# Patient Record
Sex: MALE | Race: WHITE | Employment: FULL TIME | URBAN - METROPOLITAN AREA
[De-identification: names, ages, dates, MRNs, and addresses within clinical notes are randomized per-mention and may not be internally consistent; named-entity substitution may affect disease eponyms.]

---

## 2018-01-17 PROBLEM — C43.72 MALIGNANT MELANOMA OF LEFT LOWER EXTREMITY INCLUDING HIP (HCC): Status: ACTIVE | Noted: 2018-01-17

## 2018-01-23 RX ORDER — CEFAZOLIN SODIUM IN 0.9 % NACL 2 G/50 ML
2 INTRAVENOUS SOLUTION, PIGGYBACK (ML) INTRAVENOUS ONCE
Status: CANCELLED | OUTPATIENT
Start: 2018-02-22 | End: 2018-02-22

## 2018-02-21 ENCOUNTER — HOSPITAL ENCOUNTER (OUTPATIENT)
Dept: GENERAL RADIOLOGY | Age: 53
Discharge: HOME | End: 2018-02-21
Attending: SURGERY
Payer: COMMERCIAL

## 2018-02-21 ENCOUNTER — HOSPITAL ENCOUNTER (OUTPATIENT)
Dept: LAB | Age: 53
Discharge: HOME | End: 2018-02-21
Payer: COMMERCIAL

## 2018-02-21 PROCEDURE — 83615 LACTATE (LD) (LDH) ENZYME: CPT | Performed by: SURGERY

## 2018-02-21 PROCEDURE — 80053 COMPREHEN METABOLIC PANEL: CPT | Performed by: SURGERY

## 2018-02-21 PROCEDURE — 85610 PROTHROMBIN TIME: CPT | Performed by: SURGERY

## 2018-02-21 PROCEDURE — 71046 X-RAY EXAM CHEST 2 VIEWS: CPT

## 2018-02-21 PROCEDURE — 85730 THROMBOPLASTIN TIME PARTIAL: CPT | Performed by: SURGERY

## 2018-02-21 PROCEDURE — 85027 COMPLETE CBC AUTOMATED: CPT | Performed by: SURGERY

## 2018-02-22 ENCOUNTER — APPOINTMENT (OUTPATIENT)
Dept: NUCLEAR MEDICINE | Age: 53
End: 2018-02-22
Attending: SURGERY
Payer: COMMERCIAL

## 2018-02-22 ENCOUNTER — ANESTHESIA EVENT (OUTPATIENT)
Dept: SURGERY | Age: 53
End: 2018-02-22
Payer: COMMERCIAL

## 2018-02-22 ENCOUNTER — ANESTHESIA (OUTPATIENT)
Dept: SURGERY | Age: 53
End: 2018-02-22
Payer: COMMERCIAL

## 2018-02-22 ENCOUNTER — HOSPITAL ENCOUNTER (OUTPATIENT)
Age: 53
Setting detail: OUTPATIENT SURGERY
Discharge: HOME | End: 2018-02-22
Attending: SURGERY | Admitting: SURGERY
Payer: COMMERCIAL

## 2018-02-22 VITALS
TEMPERATURE: 97.4 F | DIASTOLIC BLOOD PRESSURE: 57 MMHG | HEART RATE: 63 BPM | WEIGHT: 248 LBS | SYSTOLIC BLOOD PRESSURE: 101 MMHG | RESPIRATION RATE: 19 BRPM | OXYGEN SATURATION: 96 % | BODY MASS INDEX: 33.59 KG/M2 | HEIGHT: 72 IN

## 2018-02-22 DIAGNOSIS — C43.72 MALIGNANT MELANOMA OF LEFT LOWER EXTREMITY (HCC): ICD-10-CM

## 2018-02-22 DIAGNOSIS — C43.72 MALIGNANT MELANOMA OF LEFT LOWER EXTREMITY INCLUDING HIP (HCC): Primary | ICD-10-CM

## 2018-02-22 PROCEDURE — 76010000162 HC OR TIME 1.5 TO 2 HR INTENSV-TIER 1: Performed by: SURGERY

## 2018-02-22 PROCEDURE — 74011250636 HC RX REV CODE- 250/636

## 2018-02-22 PROCEDURE — 74011250636 HC RX REV CODE- 250/636: Performed by: ANESTHESIOLOGY

## 2018-02-22 PROCEDURE — 88341 IMHCHEM/IMCYTCHM EA ADD ANTB: CPT | Performed by: SURGERY

## 2018-02-22 PROCEDURE — A9541 TC99M SULFUR COLLOID: HCPCS

## 2018-02-22 PROCEDURE — 74011000250 HC RX REV CODE- 250

## 2018-02-22 PROCEDURE — 88305 TISSUE EXAM BY PATHOLOGIST: CPT | Performed by: SURGERY

## 2018-02-22 PROCEDURE — 76060000034 HC ANESTHESIA 1.5 TO 2 HR: Performed by: SURGERY

## 2018-02-22 PROCEDURE — 77030002996 HC SUT SLK J&J -A: Performed by: SURGERY

## 2018-02-22 PROCEDURE — 77030002916 HC SUT ETHLN J&J -A: Performed by: SURGERY

## 2018-02-22 PROCEDURE — 88342 IMHCHEM/IMCYTCHM 1ST ANTB: CPT | Performed by: SURGERY

## 2018-02-22 PROCEDURE — 76210000006 HC OR PH I REC 0.5 TO 1 HR: Performed by: SURGERY

## 2018-02-22 PROCEDURE — 74011000250 HC RX REV CODE- 250: Performed by: SURGERY

## 2018-02-22 PROCEDURE — 77030020143 HC AIRWY LARYN INTUB CGAS -A: Performed by: ANESTHESIOLOGY

## 2018-02-22 PROCEDURE — 77030020782 HC GWN BAIR PAWS FLX 3M -B: Performed by: ANESTHESIOLOGY

## 2018-02-22 PROCEDURE — 88307 TISSUE EXAM BY PATHOLOGIST: CPT | Performed by: SURGERY

## 2018-02-22 PROCEDURE — 77030011640 HC PAD GRND REM COVD -A: Performed by: SURGERY

## 2018-02-22 PROCEDURE — 74011250636 HC RX REV CODE- 250/636: Performed by: SURGERY

## 2018-02-22 PROCEDURE — 74011250637 HC RX REV CODE- 250/637: Performed by: ANESTHESIOLOGY

## 2018-02-22 PROCEDURE — 76210000020 HC REC RM PH II FIRST 0.5 HR: Performed by: SURGERY

## 2018-02-22 PROCEDURE — 77030003028 HC SUT VCRL J&J -A: Performed by: SURGERY

## 2018-02-22 PROCEDURE — 77030031139 HC SUT VCRL2 J&J -A: Performed by: SURGERY

## 2018-02-22 RX ORDER — DEXAMETHASONE SODIUM PHOSPHATE 4 MG/ML
INJECTION, SOLUTION INTRA-ARTICULAR; INTRALESIONAL; INTRAMUSCULAR; INTRAVENOUS; SOFT TISSUE AS NEEDED
Status: DISCONTINUED | OUTPATIENT
Start: 2018-02-22 | End: 2018-02-22 | Stop reason: HOSPADM

## 2018-02-22 RX ORDER — CEFAZOLIN SODIUM/WATER 2 G/20 ML
2 SYRINGE (ML) INTRAVENOUS
Status: COMPLETED | OUTPATIENT
Start: 2018-02-22 | End: 2018-02-22

## 2018-02-22 RX ORDER — SODIUM CHLORIDE 0.9 % (FLUSH) 0.9 %
5-10 SYRINGE (ML) INJECTION EVERY 8 HOURS
Status: DISCONTINUED | OUTPATIENT
Start: 2018-02-22 | End: 2018-02-22 | Stop reason: HOSPADM

## 2018-02-22 RX ORDER — KETOROLAC TROMETHAMINE 30 MG/ML
30 INJECTION, SOLUTION INTRAMUSCULAR; INTRAVENOUS AS NEEDED
Status: DISCONTINUED | OUTPATIENT
Start: 2018-02-22 | End: 2018-02-22 | Stop reason: HOSPADM

## 2018-02-22 RX ORDER — OXYCODONE AND ACETAMINOPHEN 5; 325 MG/1; MG/1
TABLET ORAL
Qty: 40 TAB | Refills: 0 | Status: SHIPPED | OUTPATIENT
Start: 2018-02-22

## 2018-02-22 RX ORDER — DOCUSATE SODIUM 100 MG/1
100 CAPSULE, LIQUID FILLED ORAL 2 TIMES DAILY
Qty: 20 CAP | Refills: 0 | Status: SHIPPED | OUTPATIENT
Start: 2018-02-22 | End: 2018-03-24

## 2018-02-22 RX ORDER — BUPIVACAINE HYDROCHLORIDE 5 MG/ML
INJECTION, SOLUTION EPIDURAL; INTRACAUDAL AS NEEDED
Status: DISCONTINUED | OUTPATIENT
Start: 2018-02-22 | End: 2018-02-22 | Stop reason: HOSPADM

## 2018-02-22 RX ORDER — MIDAZOLAM HYDROCHLORIDE 1 MG/ML
2 INJECTION, SOLUTION INTRAMUSCULAR; INTRAVENOUS
Status: DISCONTINUED | OUTPATIENT
Start: 2018-02-22 | End: 2018-02-22 | Stop reason: HOSPADM

## 2018-02-22 RX ORDER — ACETAMINOPHEN 500 MG
1000 TABLET ORAL ONCE
Status: COMPLETED | OUTPATIENT
Start: 2018-02-22 | End: 2018-02-22

## 2018-02-22 RX ORDER — FENTANYL CITRATE 50 UG/ML
INJECTION, SOLUTION INTRAMUSCULAR; INTRAVENOUS AS NEEDED
Status: DISCONTINUED | OUTPATIENT
Start: 2018-02-22 | End: 2018-02-22 | Stop reason: HOSPADM

## 2018-02-22 RX ORDER — SODIUM CHLORIDE, SODIUM LACTATE, POTASSIUM CHLORIDE, CALCIUM CHLORIDE 600; 310; 30; 20 MG/100ML; MG/100ML; MG/100ML; MG/100ML
125 INJECTION, SOLUTION INTRAVENOUS CONTINUOUS
Status: DISCONTINUED | OUTPATIENT
Start: 2018-02-22 | End: 2018-02-22 | Stop reason: HOSPADM

## 2018-02-22 RX ORDER — SODIUM CHLORIDE 0.9 % (FLUSH) 0.9 %
5-10 SYRINGE (ML) INJECTION AS NEEDED
Status: DISCONTINUED | OUTPATIENT
Start: 2018-02-22 | End: 2018-02-22 | Stop reason: HOSPADM

## 2018-02-22 RX ORDER — LIDOCAINE HYDROCHLORIDE 10 MG/ML
0.1 INJECTION INFILTRATION; PERINEURAL AS NEEDED
Status: DISCONTINUED | OUTPATIENT
Start: 2018-02-22 | End: 2018-02-22 | Stop reason: HOSPADM

## 2018-02-22 RX ORDER — LIDOCAINE HYDROCHLORIDE 20 MG/ML
INJECTION, SOLUTION EPIDURAL; INFILTRATION; INTRACAUDAL; PERINEURAL AS NEEDED
Status: DISCONTINUED | OUTPATIENT
Start: 2018-02-22 | End: 2018-02-22 | Stop reason: HOSPADM

## 2018-02-22 RX ORDER — EPHEDRINE SULFATE 50 MG/ML
INJECTION, SOLUTION INTRAVENOUS AS NEEDED
Status: DISCONTINUED | OUTPATIENT
Start: 2018-02-22 | End: 2018-02-22 | Stop reason: HOSPADM

## 2018-02-22 RX ORDER — ONDANSETRON 2 MG/ML
INJECTION INTRAMUSCULAR; INTRAVENOUS AS NEEDED
Status: DISCONTINUED | OUTPATIENT
Start: 2018-02-22 | End: 2018-02-22 | Stop reason: HOSPADM

## 2018-02-22 RX ORDER — HYDROMORPHONE HYDROCHLORIDE 2 MG/ML
0.5 INJECTION, SOLUTION INTRAMUSCULAR; INTRAVENOUS; SUBCUTANEOUS
Status: DISCONTINUED | OUTPATIENT
Start: 2018-02-22 | End: 2018-02-22 | Stop reason: HOSPADM

## 2018-02-22 RX ORDER — OXYCODONE HYDROCHLORIDE 5 MG/1
10 TABLET ORAL
Status: DISCONTINUED | OUTPATIENT
Start: 2018-02-22 | End: 2018-02-22 | Stop reason: HOSPADM

## 2018-02-22 RX ORDER — PROPOFOL 10 MG/ML
INJECTION, EMULSION INTRAVENOUS AS NEEDED
Status: DISCONTINUED | OUTPATIENT
Start: 2018-02-22 | End: 2018-02-22 | Stop reason: HOSPADM

## 2018-02-22 RX ORDER — NALOXONE HYDROCHLORIDE 0.4 MG/ML
0.1 INJECTION, SOLUTION INTRAMUSCULAR; INTRAVENOUS; SUBCUTANEOUS AS NEEDED
Status: DISCONTINUED | OUTPATIENT
Start: 2018-02-22 | End: 2018-02-22 | Stop reason: HOSPADM

## 2018-02-22 RX ADMIN — PROPOFOL 200 MG: 10 INJECTION, EMULSION INTRAVENOUS at 11:23

## 2018-02-22 RX ADMIN — FENTANYL CITRATE 50 MCG: 50 INJECTION, SOLUTION INTRAMUSCULAR; INTRAVENOUS at 12:09

## 2018-02-22 RX ADMIN — FENTANYL CITRATE 25 MCG: 50 INJECTION, SOLUTION INTRAMUSCULAR; INTRAVENOUS at 11:39

## 2018-02-22 RX ADMIN — ONDANSETRON 4 MG: 2 INJECTION INTRAMUSCULAR; INTRAVENOUS at 11:24

## 2018-02-22 RX ADMIN — EPHEDRINE SULFATE 5 MG: 50 INJECTION, SOLUTION INTRAVENOUS at 11:46

## 2018-02-22 RX ADMIN — DEXAMETHASONE SODIUM PHOSPHATE 4 MG: 4 INJECTION, SOLUTION INTRA-ARTICULAR; INTRALESIONAL; INTRAMUSCULAR; INTRAVENOUS; SOFT TISSUE at 11:24

## 2018-02-22 RX ADMIN — MIDAZOLAM HYDROCHLORIDE 2 MG: 1 INJECTION, SOLUTION INTRAMUSCULAR; INTRAVENOUS at 10:44

## 2018-02-22 RX ADMIN — OXYCODONE HYDROCHLORIDE 10 MG: 5 TABLET ORAL at 13:25

## 2018-02-22 RX ADMIN — FENTANYL CITRATE 25 MCG: 50 INJECTION, SOLUTION INTRAMUSCULAR; INTRAVENOUS at 11:15

## 2018-02-22 RX ADMIN — FENTANYL CITRATE 25 MCG: 50 INJECTION, SOLUTION INTRAMUSCULAR; INTRAVENOUS at 11:30

## 2018-02-22 RX ADMIN — EPHEDRINE SULFATE 5 MG: 50 INJECTION, SOLUTION INTRAVENOUS at 12:27

## 2018-02-22 RX ADMIN — LIDOCAINE HYDROCHLORIDE 60 MG: 20 INJECTION, SOLUTION EPIDURAL; INFILTRATION; INTRACAUDAL; PERINEURAL at 11:23

## 2018-02-22 RX ADMIN — FENTANYL CITRATE 25 MCG: 50 INJECTION, SOLUTION INTRAMUSCULAR; INTRAVENOUS at 11:45

## 2018-02-22 RX ADMIN — EPHEDRINE SULFATE 10 MG: 50 INJECTION, SOLUTION INTRAVENOUS at 11:32

## 2018-02-22 RX ADMIN — FENTANYL CITRATE 25 MCG: 50 INJECTION, SOLUTION INTRAMUSCULAR; INTRAVENOUS at 12:06

## 2018-02-22 RX ADMIN — ACETAMINOPHEN 1000 MG: 500 TABLET, FILM COATED ORAL at 10:31

## 2018-02-22 RX ADMIN — SODIUM CHLORIDE, SODIUM LACTATE, POTASSIUM CHLORIDE, AND CALCIUM CHLORIDE: 600; 310; 30; 20 INJECTION, SOLUTION INTRAVENOUS at 10:57

## 2018-02-22 RX ADMIN — Medication 2 G: at 11:14

## 2018-02-22 RX ADMIN — FENTANYL CITRATE 25 MCG: 50 INJECTION, SOLUTION INTRAMUSCULAR; INTRAVENOUS at 11:11

## 2018-02-22 RX ADMIN — SODIUM CHLORIDE, SODIUM LACTATE, POTASSIUM CHLORIDE, AND CALCIUM CHLORIDE 125 ML/HR: 600; 310; 30; 20 INJECTION, SOLUTION INTRAVENOUS at 10:32

## 2018-02-22 NOTE — BRIEF OP NOTE
BRIEF OPERATIVE NOTE    Date of Procedure: 2/22/2018   Preoperative Diagnosis: Malignant melanoma of left lower extremity (HCC) [C43.72]  Postoperative Diagnosis: Malignant melanoma of left lower extremity (HCC) [C43.72]    Procedure(s):  MELANOMA WIDE EXCISION LEFT CALF 4 cm   Complex repair 11 cm  SENTINAL NODE BIOPSY LEFT GROIN  SENTINEL NODE MAPPING    Surgeon(s) and Role:     * Jyoti Cha MD - Primary         Assistant Staff: Nurse Practitioner: Hayden Benson NP      Surgical Staff:  Circ-1: Sola Vincent RN  Circ-2: Adrian Harris RN  Circ-Relief: Manuel Aranda RN  Scrub Tech-1: [place] [employer]  Scrub Tech-Relief: Catracho Lexa  Nurse Practitioner: Hayden Benson NP  Event Time In   Incision Start 11:38 AM   Incision Close 12:44 PM     Anesthesia: General   Estimated Blood Loss: minimal  Specimens:   ID Type Source Tests Collected by Time Destination   1 : SENTINEL LYMPH NODE #1  Preservative Groin  Jyoti Cha MD 2/22/2018 12:00 PM Pathology   2 : SENTINEL LYMPH NODE #2 Preservative Groin  Jyoti Cha MD 2/22/2018 12:05 PM Pathology   3 : left lateral calf melanoma Preservative Lesion  Jyoti Cha MD 2/22/2018 12:16 PM Pathology      Findings: successful SLN biopsy   Complications: none  Implants: * No implants in log *

## 2018-02-22 NOTE — OP NOTES
[address]    Mikaela Cole  MR#: [id number]  : 1965  ACCOUNT #: [de-identified]   DATE OF SERVICE: 2018    PREOPERATIVE DIAGNOSIS:  Left calf melanoma. POSTOPERATIVE DIAGNOSIS:  Left calf melanoma. PROCEDURES PERFORMED:   1. Left inguinal sentinel lymph node biopsy with lymphatic mapping. 2.  Wide local excision of left calf melanoma 4 cm, with complex repair 11 cm. SURGEON: Latrice Khan MD    ASSISTANT: Justo Tamayo NP    ANESTHESIA: NP    ESTIMATED BLOOD LOSS: minimal    SPECIMENS REMOVED: as above    COMPLICATIONS: none      INDICATION:  This is a 64-year-old male who presented with a lesion in his left calf. This was biopsied and turned out to be 1.1 mm melanoma and he was offered surgery. He understood the risks and benefits and wished to proceed. FINDINGS:  Successful identification of sentinel lymph nodes, both are hot and inflamed. PROCEDURE:  After informed consent obtained, the patient brought into the operating room, left in supine position. General anesthesia was administered. The patient's left lower extremity and the left groin area were prepped and draped in usual routine fashion. As noted, he did have radioactive isotope injected. There were 3 lymph nodes identified, 2 close together. The third one is more distal into inside the iliac area so I decided to proceed with the 2 lymph nodes identified in the upper thigh and the inguinal area. This was confirmed by a Neoprobe. The incision was made in the left upper thigh and dissection carried through the superficial fascia and then the signal was identified and confirmed. Further dissection revealed active lymph nodes, right around the cutaneous nerve and saphenous vein. This was carefully dissected and removed and then further exploration identified another lymph node slightly higher location, it is also looking inflamed lymph node. This was removed.   The lymphatic vessels were tied with Vicryl sutures then the surgical field inspected and hemostasis obtained. The incision closed in layers and the deep layer was closed with a 3-0 Vicryl stitch and the skin closed with 4-0 Vicryl stitch in subcuticular running fashion. I then turned attention to the calf. The previous biopsy site measured about 1.5 cm. I marked 1 cm around each side and then an ellipse of skin incision was made. The incision was deepened into the subcutaneous and then carried down all the way through the fascia, then the specimen released from the fascia. This was labeled with a short superior stitch and a long posterior stitch, then the wound was undermined on both sides generously for a tension-free closure. Then, the deep layer was closed with interrupted 2-0 Vicryl stitch and the skin was closed with a running 2-0 nylon. The patient tolerated the procedure well and was transferred to recovery room in stable condition. All instrument count and lap count correct. Estimated blood loss was minimal.      Gertrude Elizabeth was the first assistant on this case. She assisted during the entire case.       Dimitri Ott MD       BY / EV  D: 02/22/2018 13:03     T: 02/22/2018 14:01  JOB #: [id number]

## 2018-02-22 NOTE — H&P
[facility]  [address] [place] [address], [address] [place] [address]  [phone]      Patient:  Namita Bermudez  : 1965     [facility]  Namita Bermudez is a 45 y.o. male who presents to the office with a melanoma of the left lower leg. He states he had a mole there and over the past year it started changing. Dermatologist (Dr. Archie Friend) biopsied the mole and pathology revealed melanoma with breslow thickness 1.10mm and brandy level IV with no ulceration.       Grandfather melanoma in 80's  Uncle melanoma in 70's             Past Medical History:   Diagnosis Date    Allergic rhinitis               Current Outpatient Prescriptions   Medication Sig Dispense Refill    PSEUDOEPHEDRINE-guaiFENesin (MUCINEX D)  mg per tablet Take 1 Tab by mouth every twelve (12) hours.        NAPROXEN NA-DIPHENHYDRAMIN HCL (ALEVE PM PO) Take  by mouth.        triamcinolone (NASACORT AQ) 55 mcg nasal inhaler 2 Sprays daily.               Allergies   Allergen Reactions    Codeine Nausea Only    Penicillin G Rash      No past surgical history on file. Family History   Problem Relation Age of Onset    Heart Disease Brother         hx bypass surgery    Diabetes Brother      Sleep Apnea Brother         non-compliant with CPAP           Social History   Substance Use Topics    Smoking status: Never Smoker    Smokeless tobacco: Never Used    Alcohol use Not on file         Review of Systems   A comprehensive review of systems was negative except for that written in the HPI.       Physical Exam       Visit Vitals    /88    Pulse 66    Ht 6' 1\" (1.854 m)    Wt 256 lb (116.1 kg)    SpO2 97%    BMI 33.78 kg/m2         General:                    Alert, oriented, cooperative, awake patient in no acute distress   Skin:                                    Warm, moist with good texture   Eyes:                                   Sclera are clear, extraocular muscles intact  HENT: Normocephalic; oral mucosa moist, nares patent; neck is supple; trachea midline  Respiratory:               Lungs clear to auscultation bilaterally, breathing is non-labored   Chest:                                  Symmetric throughout one respiratory excursion; no supraclavicular lymphadenopathy  CV:                                      Regular rate and rhythm, no appreciable murmurs, rubs, gallops  Abdomen:                  Soft, protuberant but non-distended; bowel sounds are normoactive   Extremities:               No cyanosis, clubbing or edema, scabbed area noted to left lateral calf. No groin lymphadenopathy appreciated  Neurological:             No focal signs        Labs: No results found for: CMP, APTT, PTP, INR      Assessment/Plan:   Namita Bermudez is a 45 y.o. male who has signs and symptoms consistent with T2 Melanoma to left lower leg. Discussed the need for clear margins and this would make his incision quite long. He voiced understanding.     1. Wide local excision with SLND     Praveena Crowley NP     I have seen and examined the patient with the Nurse Practitioner and agree with the above assessment and plan.      No new c/o, ready to proceed.   Juanito Domingo MD

## 2018-02-22 NOTE — IP AVS SNAPSHOT
Marvel Frederick 
 
 
 [address] [facility] [address] 
[phone] Patient: Abhi Alcantar MRN: [id number] FFW:[id number][birth year][id number] About your hospitalization You were admitted on:  February 22, 2018 You last received care in the:  [facility] PACU You were discharged on:  February 22, 2018 Why you were hospitalized Your primary diagnosis was:  Not on File Follow-up Information Follow up With Details Comments Contact Info Damaris Greene MD Go on 3/6/2018 follow up appointment at 1:30pm [address] 
[address] [address] 
[phone] Your Scheduled Appointments Tuesday March 06, 2018  1:30 PM EST Global Post Op with Damaris Greene MD  
[facility] ([facility]) [address] [place] [address]  
[phone] Discharge Orders None A check imelda indicates which time of day the medication should be taken. My Medications START taking these medications Instructions Each Dose to Equal  
 Morning Noon Evening Bedtime  
 docusate sodium 100 mg capsule Commonly known as:  Carletha Benne Your last dose was: Your next dose is: Take 1 Cap by mouth two (2) times a day for 30 days. 100 mg  
    
   
   
   
  
 oxyCODONE-acetaminophen 5-325 mg per tablet Commonly known as:  PERCOCET Your last dose was: Your next dose is:    
   
   
 1-2 tabs by mouth every four hours prn pain CONTINUE taking these medications Instructions Each Dose to Equal  
 Morning Noon Evening Bedtime ALEVE PM PO Your last dose was: Your next dose is: Take 1 Tab by mouth as needed. 1 Tab Where to Get Your Medications These medications [id number]   [address] [facility], [address] [place] [address] Phone:  [phone] docusate sodium 100 mg capsule Information on where to get these meds will be given to you by the nurse or doctor. ! Ask your nurse or doctor about these medications  
  oxyCODONE-acetaminophen 5-325 mg per tablet Discharge Instructions Discharge Instructions/Follow-up Plans: MD Instructions: 
  
Follow-up with Dr. Charito Hurley. In 2 weeks, will call if we get pathology results sooner Remove plastic dressing and gauze after 48 hours, leave sterile strips on for 7-10 days Keep incisions clean and dry, may remain uncovered after 48 hours ok to use dry dressing if draining Do not apply lotions, creams or ointments to incisions. 
  
Diet - as tolerated Activity - ambulate - as tolerated May shower - no tub baths or soaking/submerging. 
  
No driving while taking narcotics. Do not drink alcohol while taking narcotics. Resume other home medications. Take Rx as prescribed Take colace stool softener as needed to avoid constipation will on narcotics 
  
If problems or questions arise, please call our office at [phone]. 
  
Greater than 30 minutes were spent discharging the patientAfter general anesthesia or intravenous sedation, for 24 hours or while taking prescription Narcotics: · Limit your activities · Do not drive and operate hazardous machinery · Do not make important personal or business decisions · Do  not drink alcoholic beverages · If you have not urinated within 8 hours after discharge, please contact your surgeon on call. *  Please give a list of your current medications to your Primary Care Provider. *  Please update this list whenever your medications are discontinued, doses are 
    changed, or new medications (including over-the-counter products) are added. *  Please carry medication information at all times in case of emergency situations. These are general instructions for a healthy lifestyle: No smoking/ No tobacco products/ Avoid exposure to second hand smoke Surgeon General's Warning:  Quitting smoking now greatly reduces serious risk to your health. Obesity, smoking, and sedentary lifestyle greatly increases your risk for illness A healthy diet, regular physical exercise & weight monitoring are important for maintaining a healthy lifestyle You may be retaining fluid if you have a history of heart failure or if you experience any of the following symptoms:  Weight gain of 3 pounds or more overnight or 5 pounds in a week, increased swelling in our hands or feet or shortness of breath while lying flat in bed. Please call your doctor as soon as you notice any of these symptoms; do not wait until your next office visit. Recognize signs and symptoms of STROKE: 
F-face looks uneven A-arms unable to move or move unevenly S-speech slurred or non-existent T-time-call 911 as soon as signs and symptoms begin-DO NOT go Back to bed or wait to see if you get better-TIME IS BRAIN Introducing [facility]! [facility] introduces Joome patient portal. Now you can access parts of your medical record, email your doctor's office, and request medication refills online. 1. In your internet browser, go to [email or web address] 2. Click on the First Time User? Click Here link in the Sign In box. You will see the New Member Sign Up page. 3. Enter your Joome Access Code exactly as it appears below. You will not need to use this code after youve completed the sign-up process. If you do not sign up before the expiration date, you must request a new code. · Joome Access Code: [email or web address] Expires: 4/16/2018  3:43 PM 
 
4. Enter the last four digits of your Social Security Number (xxxx) and Date of Birth (mm/dd/yyyy) as indicated and click Submit. You will be taken to the next sign-up page. 5. Create a Gatekeeper System ID. This will be your Gatekeeper System login ID and cannot be changed, so think of one that is secure and easy to remember. 6. Create a Gatekeeper System password. You can change your password at any time. 7. Enter your Password Reset Question and Answer. This can be used at a later time if you forget your password. 8. Enter your e-mail address. You will receive e-mail notification when new information is available in [address] 9. Click Sign Up. You can now view and download portions of your medical record. 10. Click the Download Summary menu link to download a portable copy of your medical information. If you have questions, please visit the Frequently Asked Questions section of the Gatekeeper System website. Remember, Gatekeeper System is NOT to be used for urgent needs. For medical emergencies, dial 911. Now available from your iPhone and Android! Providers Seen During Your Hospitalization Provider Specialty Primary office phone Jean-Claude Sheets MD General Surgery [phone] Your Primary Care Physician (PCP) Primary Care Physician Office Phone Office Fax Roberto Lugo [phone] You are allergic to the following Allergen Reactions Codeine Nausea Only Penicillin G Rash Patient states he has taken other \"cillins\" and has been fine. Recent Documentation Height Weight BMI Smoking Status 1.829 m 112.5 kg 33.63 kg/m2 Never Smoker Emergency Contacts Name Discharge Info Relation Home Work Mobile Yue Moe DISCHARGE CAREGIVER [3] Spouse [3] [phone] Patient Belongings The following personal items are in your possession at time of discharge: 
  Dental Appliances: None         Home Medications: None   Jewelry: None  Clothing: Shirt, Pants, Undergarments, Footwear, Socks    Other Valuables: Cell Phone, Selina Puja Please provide this summary of care documentation to your next provider. Signatures-by signing, you are acknowledging that this After Visit Summary has been reviewed with you and you have received a copy. Patient Signature:  ____________________________________________________________ Date:  ____________________________________________________________  
  
Curlie Ruths Provider Signature:  ____________________________________________________________ Date:  ____________________________________________________________

## 2018-02-22 NOTE — ANESTHESIA POSTPROCEDURE EVALUATION
Post-Anesthesia Evaluation and Assessment    Patient: Reji Da Silva MRN: [id number]  SSN: [id number]    YOB: 1965  Age: 52 y.o. Sex: male       Cardiovascular Function/Vital Signs  Visit Vitals    /57    Pulse 63    Temp 36.3 °C (97.4 °F)    Resp 19    Ht 6' (1.829 m)    Wt 112.5 kg (248 lb)    SpO2 96%    BMI 33.63 kg/m2       Patient is status post general anesthesia for Procedure(s):  MELANOMA WIDE EXCISION LEFT CALF AND SENTINAL NODE DISECTION LEFT GROIN  SENTINEL NODE MAPPING. Nausea/Vomiting: None    Postoperative hydration reviewed and adequate. Pain:  Pain Scale 1: Numeric (0 - 10) (02/22/18 1327)  Pain Intensity 1: 2 (02/22/18 1327)   Managed    Neurological Status:   Neuro (WDL): Within Defined Limits (02/22/18 1323)  Neuro  Neurologic State: Drowsy (02/22/18 1254)  LUE Motor Response: Purposeful (02/22/18 1254)  LLE Motor Response: Purposeful (02/22/18 1254)  RUE Motor Response: Purposeful (02/22/18 1254)  RLE Motor Response: Purposeful (02/22/18 1254)   At baseline    Mental Status and Level of Consciousness: Arousable    Pulmonary Status:   O2 Device: Room air (02/22/18 1323)   Adequate oxygenation and airway patent    Complications related to anesthesia: None    Post-anesthesia assessment completed.  No concerns    Signed By: Bryant Barajas MD     February 22, 2018

## 2018-02-22 NOTE — ANESTHESIA PREPROCEDURE EVALUATION
Anesthetic History               Review of Systems / Medical History  Patient summary reviewed, nursing notes reviewed and pertinent labs reviewed    Pulmonary        Sleep apnea           Neuro/Psych              Cardiovascular                  Exercise tolerance: >4 METS     GI/Hepatic/Renal                Endo/Other        Obesity     Other Findings            Physical Exam    Airway  Mallampati: I  TM Distance: > 6 cm  Neck ROM: normal range of motion   Mouth opening: Normal     Cardiovascular  Regular rate and rhythm,  S1 and S2 normal,  no murmur, click, rub, or gallop             Dental  No notable dental hx       Pulmonary  Breath sounds clear to auscultation               Abdominal  GI exam deferred       Other Findings            Anesthetic Plan    ASA: 1  Anesthesia type: general            Anesthetic plan and risks discussed with: Patient and Spouse

## 2018-02-22 NOTE — DISCHARGE INSTRUCTIONS
Discharge Instructions/Follow-up Plans:   MD Instructions:     Follow-up with Dr. Krystal Jacob. In 2 weeks, will call if we get pathology results sooner  Remove plastic dressing and gauze after 48 hours, leave sterile strips on for 7-10 days  Keep incisions clean and dry, may remain uncovered after 48 hours ok to use dry dressing if draining  Do not apply lotions, creams or ointments to incisions.     Diet - as tolerated  Activity - ambulate - as tolerated  May shower - no tub baths or soaking/submerging.     No driving while taking narcotics. Do not drink alcohol while taking narcotics. Resume other home medications. Take Rx as prescribed   Take colace stool softener as needed to avoid constipation will on narcotics     If problems or questions arise, please call our office at [phone].     Greater than 30 minutes were spent discharging the patientAfter general anesthesia or intravenous sedation, for 24 hours or while taking prescription Narcotics:  · Limit your activities  · Do not drive and operate hazardous machinery  · Do not make important personal or business decisions  · Do  not drink alcoholic beverages  · If you have not urinated within 8 hours after discharge, please contact your surgeon on call. *  Please give a list of your current medications to your Primary Care Provider. *  Please update this list whenever your medications are discontinued, doses are      changed, or new medications (including over-the-counter products) are added. *  Please carry medication information at all times in case of emergency situations. These are general instructions for a healthy lifestyle:  No smoking/ No tobacco products/ Avoid exposure to second hand smoke  Surgeon General's Warning:  Quitting smoking now greatly reduces serious risk to your health.   Obesity, smoking, and sedentary lifestyle greatly increases your risk for illness  A healthy diet, regular physical exercise & weight monitoring are important for maintaining a healthy lifestyle    You may be retaining fluid if you have a history of heart failure or if you experience any of the following symptoms:  Weight gain of 3 pounds or more overnight or 5 pounds in a week, increased swelling in our hands or feet or shortness of breath while lying flat in bed. Please call your doctor as soon as you notice any of these symptoms; do not wait until your next office visit.     Recognize signs and symptoms of STROKE:  F-face looks uneven  A-arms unable to move or move unevenly  S-speech slurred or non-existent  T-time-call 911 as soon as signs and symptoms begin-DO NOT go       Back to bed or wait to see if you get better-TIME IS BRAIN

## 2022-03-04 ENCOUNTER — HOSPITAL ENCOUNTER (OUTPATIENT)
Dept: LAB | Age: 57
Discharge: HOME | End: 2022-03-04

## 2022-03-04 PROCEDURE — 88305 TISSUE EXAM BY PATHOLOGIST: CPT

## (undated) DEVICE — COVER US PRB W12XL244CM FLD IORT STR TIP

## (undated) DEVICE — SUTURE PERMAHAND SZ 0 L30IN NONABSORBABLE BLK L30MM PSL 3/8 590H

## (undated) DEVICE — SUT ETHLN 3-0 18IN PS1 BLK --

## (undated) DEVICE — DRAPE TWL SURG 16X26IN BLU ORB04] ALLCARE INC]

## (undated) DEVICE — SUTURE VCRL SZ 2-0 L27IN ABSRB UD L26MM SH 1/2 CIR J417H

## (undated) DEVICE — SUTURE VCRL SZ 3-0 L27IN ABSRB UD L26MM SH 1/2 CIR J416H

## (undated) DEVICE — DRAPE,U/SHT,SPLIT,FILM,60X84,STERILE: Brand: MEDLINE

## (undated) DEVICE — INTENDED FOR TISSUE SEPARATION, AND OTHER PROCEDURES THAT REQUIRE A SHARP SURGICAL BLADE TO PUNCTURE OR CUT.: Brand: BARD-PARKER SAFETY BLADES SIZE 15, STERILE

## (undated) DEVICE — MASTISOL ADHESIVE LIQ 2/3ML

## (undated) DEVICE — 2000CC GUARDIAN II: Brand: GUARDIAN

## (undated) DEVICE — SURGICAL PROCEDURE PACK BASIC ST FRANCIS

## (undated) DEVICE — SHEET, DRAPE, SPLIT, STERILE: Brand: MEDLINE

## (undated) DEVICE — SUTURE VCRL SZ 4-0 L27IN ABSRB UD L19MM PS-2 3/8 CIR PRIM J426H

## (undated) DEVICE — SUTURE ETHLN SZ 2-0 L18IN NONABSORBABLE BLK L26MM PS 3/8 585H

## (undated) DEVICE — SUTURE VCRL SZ 3-0 L18IN ABSRB UD W/O NDL POLYGLACTIN 910 J110T

## (undated) DEVICE — (D)STRIP SKN CLSR 0.5X4IN WHT --

## (undated) DEVICE — 3M™ TEGADERM™ TRANSPARENT FILM DRESSING FRAME STYLE, 1628, 6 IN X 8 IN (15 CM X 20 CM), 10/CT 8CT/CASE: Brand: 3M™ TEGADERM™

## (undated) DEVICE — REM POLYHESIVE ADULT PATIENT RETURN ELECTRODE: Brand: VALLEYLAB

## (undated) DEVICE — NEEDLE HYPO 25GA L1.5IN BLU POLYPR HUB S STL REG BVL STR

## (undated) DEVICE — 3M™ TEGADERM™ TRANSPARENT FILM DRESSING FRAME STYLE, 1626W, 4 IN X 4-3/4 IN (10 CM X 12 CM), 50/CT 4CT/CASE: Brand: 3M™ TEGADERM™

## (undated) DEVICE — PAD,NON-ADHERENT,3X8,STERILE,LF,1/PK: Brand: MEDLINE

## (undated) DEVICE — (D)PREP SKN CHLRAPRP APPL 26ML -- CONVERT TO ITEM 371833

## (undated) DEVICE — BLADE ASSEMB CLP HAIR FINE --

## (undated) DEVICE — DRAPE,TOP,102X53,STERILE: Brand: MEDLINE